# Patient Record
(demographics unavailable — no encounter records)

---

## 2024-10-31 NOTE — HISTORY OF PRESENT ILLNESS
[FreeTextEntry8] : 72 yo F presenting for evaluation for three month progressive fatigue. There is no weakness, muscle aches, fevers, chills, weight loss, loss of appetite, depression, anxiety, joint pains, rash.

## 2024-10-31 NOTE — HEALTH RISK ASSESSMENT
[No] : No [No falls in past year] : Patient reported no falls in the past year [0] : 2) Feeling down, depressed, or hopeless: Not at all (0) [PHQ-2 Negative - No further assessment needed] : PHQ-2 Negative - No further assessment needed [Never] : Never [PFI4Pauul] : 0

## 2024-11-13 NOTE — ASSESSMENT
[FreeTextEntry1] : For prevention,  Continue propranolol 160 mg twice daily.  Whenever she has tried to reduce this her headaches come back. Continue topiramate 200 mg daily.  She is aware of its potential to cause glaucoma but her ophthalmologist is also aware of this and performed the iridotomy and she will schedule for her cataract surgery in the next few months. She keeps herself very well-hydrated.  She knows the risk of kidney stones with Topamax. Continue magnesium 400 mg daily. Continue Emgality.  For rescue, Zomig as needed for now.  Sometimes it is not consistent, will order Nurtec to take as needed.    Maintain headache journal.  Imaging as outlined below- headaches have changed characteristic and are waking her up from sleep.   Follow-up in 6 months.

## 2024-11-13 NOTE — PHYSICAL EXAM
[FreeTextEntry1] : Physical examination  General: No acute distress, Awake, Alert.   Mental status  Awake, alert, and oriented to person, time and place, Normal attention span and concentration, Recent and remote memory intact, Language intact, Fund of knowledge intact.  Cranial Nerves  II: VFF  III, IV, VI: PERRL, EOMI. left eye pupil 1 mm and right eye pupil 2 mm.  V: Facial sensation is normal B/L.  VII: Facial strength is normal B/L.  VIII: Gross hearing is intact.  IX, X: Palate is midline and elevates symmetrically.  XI: Trapezius normal strength.  XII: Tongue midline without atrophy or fasciculations.   Motor exam  Muscle tone - no evidence of rigidity or resistance in all 4 extremities.  No atrophy or fasciculations  Muscle Strength: arms and legs, proximal and distal flexors and extensors are normal  No UE drift.  Reflexes  All present, normal, and symmetrical.  Plantars right: mute.  Plantars left: mute.   Coordination  Finger to nose: Normal.  Heel to shin: Normal.  Gait  Normal

## 2024-11-13 NOTE — HISTORY OF PRESENT ILLNESS
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 Headache Age of onset: age 14  Description Unilateral - could switch sides - now trigeminal pattern - on the left (Post Covid)- headaches were under excellent control for 3 years until Covid last December (2023).  Quality- pulsating, stabbing  Nausea but no vomiting Light and sound sensitivity Warning/aura (premonitory phase)- left ear tingles and within 30 seconds to one minute, headache  Postdrome: fatigue  Nocturnal awakening- wake her up from a deep sleep  Associated with exertion or Valsalva- no   Once headache starts- duration: With medication: Without medication:  Average number of headache days per week: one per week (usually in clusters)  Menopause - did not help Association with menstrual cycle- no association  Triggers weather dehydration salt   Sleep pattern: 11 pm - 1 am - wakes up 8-9 am   Water intake: 48-64 oz   Caffeine intake: one cup espresso   Anxiety/depression/stress: no   Family History of headaches: Mother, sister, father, (maternal and paternal)  Personal history of head trauma: fell one year ago   Preventatives tried: Propranolol - since age 22; Topiramate - 10 years ago - (ophthalmology aware- did iridotomy for chronic anterior angle glaucoma- done to prevent acute angle, cataracts were pushing- cataract surgery in the future); Emgality now (was on Ajovy)  Takes branded Topamax - Carlos (generic dizziness) Branded Zomig - Carlos Magnesium 400 mg daily   Rescue tried: Sumatriptan, Maxalt, Zomig   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 No ptosis or unilateral anhidrosis.  Calcium score 0.  No coronary artery disease.

## 2024-11-13 NOTE — HISTORY OF PRESENT ILLNESS
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 Headache Age of onset: age 14  Description Unilateral - could switch sides - now trigeminal pattern - on the left (Post Covid)- headaches were under excellent control for 3 years until Covid last December (2023).  Quality- pulsating, stabbing  Nausea but no vomiting Light and sound sensitivity Warning/aura (premonitory phase)- left ear tingles and within 30 seconds to one minute, headache  Postdrome: fatigue  Nocturnal awakening- wake her up from a deep sleep  Associated with exertion or Valsalva- no   Once headache starts- duration: With medication: Without medication:  Average number of headache days per week: one per week (usually in clusters)  Menopause - did not help Association with menstrual cycle- no association  Triggers weather dehydration salt   Sleep pattern: 11 pm - 1 am - wakes up 8-9 am   Water intake: 48-64 oz   Caffeine intake: one cup espresso   Anxiety/depression/stress: no   Family History of headaches: Mother, sister, father, (maternal and paternal)  Personal history of head trauma: fell one year ago   Preventatives tried: Propranolol - since age 22; Topiramate - 10 years ago - (ophthalmology aware- did iridotomy for chronic anterior angle glaucoma- done to prevent acute angle, cataracts were pushing- cataract surgery in the future); Emgality now (was on Ajovy)  Takes branded Topamax - Carlos (generic dizziness) Branded Zomig - Carlos Magnesium 400 mg daily   Rescue tried: Sumatriptan, Maxalt, Zomig   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 No ptosis or unilateral anhidrosis.  Calcium score 0.  No coronary artery disease.

## 2025-01-22 NOTE — HISTORY OF PRESENT ILLNESS
[FreeTextEntry1] : follow up 6 months [de-identified] : 71 yo F with hx of SCC on nose, chronic migraines, MVP, PFO, osteopoenia, GERD, presenting for 6 month follow up. 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 Patient established care with neurology for management of migraines and they have been well controlled. Needs refills on some medications today. Also reports chronic right lateral and anterior hip pain that has been persistent. Reports had MRI previously but per patient was negative. She has done multiple rounds of physical therapy for its management.

## 2025-01-22 NOTE — PLAN
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 Patient established care with neurology for management of migraines and they have been well controlled. Needs refills on some medications today. Also reports chronic right lateral and anterior hip pain that has been persistent. Reports had MRI previously but per patient was negative. She has done multiple rounds of physical therapy for its management.  HLD - labs drawn, statin renewed Migraines - stable, follows with neurology, chart reviewed Right hip pain - referral to ortho sports medicine for evaluation, imaging deferred at this time Return to clinic in 6 months for annual physical, all questions answered

## 2025-01-22 NOTE — PHYSICAL EXAM
[Normal] : affect was normal and insight and judgment were intact [de-identified] : negative MATT, FADIR test, right hip pain

## 2025-01-22 NOTE — PHYSICAL EXAM
[Normal] : affect was normal and insight and judgment were intact [de-identified] : negative MATT, FADIR test, right hip pain

## 2025-01-22 NOTE — HISTORY OF PRESENT ILLNESS
[FreeTextEntry1] : follow up 6 months [de-identified] : 71 yo F with hx of SCC on nose, chronic migraines, MVP, PFO, osteopoenia, GERD, presenting for 6 month follow up. 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 Patient established care with neurology for management of migraines and they have been well controlled. Needs refills on some medications today. Also reports chronic right lateral and anterior hip pain that has been persistent. Reports had MRI previously but per patient was negative. She has done multiple rounds of physical therapy for its management.

## 2025-02-14 NOTE — PHYSICAL EXAM
[de-identified] :  Well appearing person in no acute distress. Alert and oriented x 3. Appears their stated age. Normal mood and affect.  They have good range of motion of her hip today. They have no pain with flexion. They have a positive FADIR test. Mild tenderness over the greater trochanteric region. No pain with range of motion of the knee or ankle. No pain with range of motion of the lumbar spine.  Mild discomfort with a Stinchfield. [de-identified] : Multiple weightbearing views right hip and pelvis ordered and reviewed.  These demonstrate significant arthritic changes in the right hip.  Left hip joint spaces well-maintained.  Right hip is getting close to bone-on-bone arthritis

## 2025-02-14 NOTE — DISCUSSION/SUMMARY
[de-identified] : I had a long discussion with the patient.  We discussed operative and nonoperative treatment options.  Right now I think she would best be served with a one-time steroid injection ultrasound-guided into the right hip joint.  She was amenable to this.  Will see how she does on this.  If she continues to have discomfort she may be a candidate for operative sometime for a total hip arthroplasty but right now hopefully we can get the hip quite down with just the injection.  Should continue with the therapy exercises.  All of her questions were answered, she agrees with the plan

## 2025-02-14 NOTE — PHYSICAL EXAM
[de-identified] :  Well appearing person in no acute distress. Alert and oriented x 3. Appears their stated age. Normal mood and affect.  They have good range of motion of her hip today. They have no pain with flexion. They have a positive FADIR test. Mild tenderness over the greater trochanteric region. No pain with range of motion of the knee or ankle. No pain with range of motion of the lumbar spine.  Mild discomfort with a Stinchfield. [de-identified] : Multiple weightbearing views right hip and pelvis ordered and reviewed.  These demonstrate significant arthritic changes in the right hip.  Left hip joint spaces well-maintained.  Right hip is getting close to bone-on-bone arthritis

## 2025-02-14 NOTE — HISTORY OF PRESENT ILLNESS
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  This is been going on for some time.  Pain is mostly in the groin.  Usually she can get this better on her own with the therapy exercises but this time is persisted.  She is tried some anti-inflammatory medications as well as icing and is only seen a bit of benefit.

## 2025-02-14 NOTE — HISTORY OF PRESENT ILLNESS
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  This is been going on for some time.  Pain is mostly in the groin.  Usually she can get this better on her own with the therapy exercises but this time is persisted.  She is tried some anti-inflammatory medications as well as icing and is only seen a bit of benefit.

## 2025-02-14 NOTE — DISCUSSION/SUMMARY
[de-identified] : I had a long discussion with the patient.  We discussed operative and nonoperative treatment options.  Right now I think she would best be served with a one-time steroid injection ultrasound-guided into the right hip joint.  She was amenable to this.  Will see how she does on this.  If she continues to have discomfort she may be a candidate for operative sometime for a total hip arthroplasty but right now hopefully we can get the hip quite down with just the injection.  Should continue with the therapy exercises.  All of her questions were answered, she agrees with the plan

## 2025-02-18 NOTE — ASSESSMENT
[FreeTextEntry1] : Chronic right hip and groin pain with findings of hip osteoarthritis on exam and x-ray - Right ultrasound-guided corticosteroid injection given to the right hip - Tylenol and ice as needed for injection site relief - Discussed activity modifications - Follow-up as needed.  Earliest repeat would be at 5/18/2025

## 2025-02-18 NOTE — PHYSICAL EXAM
[de-identified] : Constitutional: Well developed, well nourished, able to communicate Neuro: Normal sensation, No focal deficits Skin: Intact CV: Peripheral vascular exam grossly normal Pulm: No signs of respiratory distress Psych: Oriented, normal mood and affect      R hip: - No obvious deformity or swelling - No tenderness to palpation of greater trochanter - No pain with full flexion, external rotation - Limited internal rotation with pain - 5/5 strength hip flexion, abduction, adduction - Negative MATT - Positive FADIR - Mild pain with logroll - Distally neurovascularly intact

## 2025-02-18 NOTE — PROCEDURE
[FreeTextEntry1] : A pre-procedure verification was performed by asking the patient to state their name, , and the location of the procedure being performed in their own words.  A review of allergies was accomplished through dialog and the patient, ending thus in the full agreement. The risks and benefits were explained and consent were obtained verbally.  Procedure: Ultrasound Guided Intraarticular Injection >>  Hip, right  Consent was obtained and recorded. Patients questions were answered to the best of my ability prior to procedure. Injection site and surrounding bony landmarks were palpated, and marked prior to proceeding. Target location and needle pathway identified under ultrasound. Ultrasound probe was sterilized in the typical fashion prior to application of sterile gel. Injection site was cleaned and prepped in the typical fashion using alcohol swabs. Cold spray used on skin for patient comfort. 3cc Lidocaine w/o Epi was used to anesthetize the needle path. Injection site and ultrasound probe we re-cleaned/sterilized and re-prepped in the typical fashion while local anesthesia took effect. Sterile gel was applied and site was identified once again with ultrasound. Needle was advanced into the intraarticular space from the anterior approach. A combination of 80mg Depomedrol and 3cc Marcaine w/o Epi was then injected into the joint. Patient tolerated the procedure well, without significant complications. Minimal (<3cc) blood loss experienced. Images were saved into patient's chart.

## 2025-02-18 NOTE — HISTORY OF PRESENT ILLNESS
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  She has had the symptoms in the past but is usually improved with physical therapy and time.  She has tried anti-inflammatory medications without much benefit.  She was seen initially by Dr. Oconnor and recommended for a right hip corticosteroid injection.  She denies any injuries, weakness, paresthesias

## 2025-02-28 NOTE — HISTORY OF PRESENT ILLNESS
[FreeTextEntry8] : 71 yo F presenting for evaluation 1 month hx of loose stools. Patient reports she is normally constipated and often goes about every 2-3 days. She had an episode of eating homemade egg salad on 2/1/25 that resulted in vomiting, nausea and loose stools and since then have not been same calibre. She has been following BRAT diet and making dietary changes to help her gut leonel, including having a probiotic. Currently she has a loose stool every 2-3 days as per her usual bowel movements. Denies any pain, cramping, blood in stool, tissue in stool.

## 2025-02-28 NOTE — PLAN
[FreeTextEntry1] : No red flag signs and symptoms Continue to incorporate regular diet back into her daily routine and monitor stools Infrequent at this point Continue bulking foods for loose stools All questions answered Exam reassuring

## 2025-05-15 NOTE — ASSESSMENT
[FreeTextEntry1] : For prevention,  Continue propranolol 160 mg twice daily.  Whenever she has tried to reduce this her headaches come back. Continue topiramate 200 mg daily.  She is aware of its potential to cause glaucoma but her ophthalmologist is also aware of this and performed the iridotomy and she will schedule for her cataract surgery in the next few months. She keeps herself very well-hydrated.  She knows the risk of kidney stones with Topamax. Continue magnesium 400 mg daily.   For rescue, Nurtec prn  Maintain headache journal.  Follow-up in one year

## 2025-05-15 NOTE — HISTORY OF PRESENT ILLNESS
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 Shes states that her headaches have been well controlled on current regimen. She has only had to use Nurtec once for rescue and it did work. Denies lateralizing deficits with headaches.   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 Headache Age of onset: age 14  Description Unilateral - could switch sides - now trigeminal pattern - on the left (Post Covid)- headaches were under excellent control for 3 years until Covid last December (2023).  Quality- pulsating, stabbing  Nausea but no vomiting Light and sound sensitivity Warning/aura (premonitory phase)- left ear tingles and within 30 seconds to one minute, headache  Postdrome: fatigue  Nocturnal awakening- wake her up from a deep sleep  Associated with exertion or Valsalva- no   Once headache starts- duration: With medication: Without medication:  Average number of headache days per week: one per week (usually in clusters)  Menopause - did not help Association with menstrual cycle- no association  Triggers weather dehydration salt   Sleep pattern: 11 pm - 1 am - wakes up 8-9 am   Water intake: 48-64 oz   Caffeine intake: one cup espresso   Anxiety/depression/stress: no   Family History of headaches: Mother, sister, father, (maternal and paternal)  Personal history of head trauma: fell one year ago   Preventatives tried: Propranolol - since age 22; Topiramate - 10 years ago - (ophthalmology aware- did iridotomy for chronic anterior angle glaucoma- done to prevent acute angle, cataracts were pushing- cataract surgery in the future); Emgality now (was on Ajovy)  Takes branded Topamax - Carlos (generic dizziness) Branded Zomig - Carlos Magnesium 400 mg daily   Rescue tried: Sumatriptan, Maxalt, Zomig   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 No ptosis or unilateral anhidrosis.  Calcium score 0.  No coronary artery disease.

## 2025-05-15 NOTE — DATA REVIEWED
[de-identified] : Exam Date:      12/13/24 Exam:         MRI BRAIN WAW  Order#:       [id number]    CLINICAL INDICATIONS: Chronic migraines   COMPARISON: None.   TECHNIQUE: MRI brain: Multiplanar, multisequence MR imaging of the brain are obtained with and without the administration of 6.5 cc intravenous Gadavist contrast. 1 cc of contrast was discarded.   FINDINGS:  Hypoenhancing lesion right aspect pituitary gland measures 6.2 mm suggesting a cystic microadenoma. Correlate with serology and dedicated pituitary gland imaging as clinically warranted.  There is no abnormal restricted diffusion to suggest acute infarction. Scattered periventricular and subcortical white matter T2 /FLAIR hyperintensities are seen without mass effect, nonspecific, likely representing mild chronic microvascular changes. Normal T2 flow-voids are seen within  the intracranial vasculature. The lateral ventricles and cortical sulci are age-appropriate in size and configuration. There is no mass, mass effect, or extra-axial fluid collection. Small susceptibility artifacts in the paramedian left frontal lobe and left parietal lobe suggesting cavernoma. Structures are normal.  The visualized paranasal sinuses, mastoid air cells and orbits are unremarkable.      ======================    CLINICAL INDICATIONS:MIOSIS   TECHNIQUE: MRA brain. 3-D time of flight imaging with 2D MIP reconstructions.   COMPARISON: None   FINDINGS:   The [place] of [place] and vertebrobasilar system are unremarkable without evidence of stenosis, occlusion or saccular aneurysm dilation. No evidence for arterial venous malformation. The left vertebral artery is dominant.     =====================    IMPRESSION:   MRI BRAIN: No acute intracranial pathology. Possible small pituitary gland microadenoma.   MRA BRAIN: Unremarkable.   --- End of Report ---

## 2025-05-15 NOTE — DATA REVIEWED
[de-identified] : Exam Date:      12/13/24 Exam:         MRI BRAIN WAW  Order#:       [id number]    CLINICAL INDICATIONS: Chronic migraines   COMPARISON: None.   TECHNIQUE: MRI brain: Multiplanar, multisequence MR imaging of the brain are obtained with and without the administration of 6.5 cc intravenous Gadavist contrast. 1 cc of contrast was discarded.   FINDINGS:  Hypoenhancing lesion right aspect pituitary gland measures 6.2 mm suggesting a cystic microadenoma. Correlate with serology and dedicated pituitary gland imaging as clinically warranted.  There is no abnormal restricted diffusion to suggest acute infarction. Scattered periventricular and subcortical white matter T2 /FLAIR hyperintensities are seen without mass effect, nonspecific, likely representing mild chronic microvascular changes. Normal T2 flow-voids are seen within  the intracranial vasculature. The lateral ventricles and cortical sulci are age-appropriate in size and configuration. There is no mass, mass effect, or extra-axial fluid collection. Small susceptibility artifacts in the paramedian left frontal lobe and left parietal lobe suggesting cavernoma. Structures are normal.  The visualized paranasal sinuses, mastoid air cells and orbits are unremarkable.      ======================    CLINICAL INDICATIONS:MIOSIS   TECHNIQUE: MRA brain. 3-D time of flight imaging with 2D MIP reconstructions.   COMPARISON: None   FINDINGS:   The [place] of [place] and vertebrobasilar system are unremarkable without evidence of stenosis, occlusion or saccular aneurysm dilation. No evidence for arterial venous malformation. The left vertebral artery is dominant.     =====================    IMPRESSION:   MRI BRAIN: No acute intracranial pathology. Possible small pituitary gland microadenoma.   MRA BRAIN: Unremarkable.   --- End of Report ---

## 2025-05-15 NOTE — HISTORY OF PRESENT ILLNESS
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 Shes states that her headaches have been well controlled on current regimen. She has only had to use Nurtec once for rescue and it did work. Denies lateralizing deficits with headaches.   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 Headache Age of onset: age 14  Description Unilateral - could switch sides - now trigeminal pattern - on the left (Post Covid)- headaches were under excellent control for 3 years until Covid last December (2023).  Quality- pulsating, stabbing  Nausea but no vomiting Light and sound sensitivity Warning/aura (premonitory phase)- left ear tingles and within 30 seconds to one minute, headache  Postdrome: fatigue  Nocturnal awakening- wake her up from a deep sleep  Associated with exertion or Valsalva- no   Once headache starts- duration: With medication: Without medication:  Average number of headache days per week: one per week (usually in clusters)  Menopause - did not help Association with menstrual cycle- no association  Triggers weather dehydration salt   Sleep pattern: 11 pm - 1 am - wakes up 8-9 am   Water intake: 48-64 oz   Caffeine intake: one cup espresso   Anxiety/depression/stress: no   Family History of headaches: Mother, sister, father, (maternal and paternal)  Personal history of head trauma: fell one year ago   Preventatives tried: Propranolol - since age 22; Topiramate - 10 years ago - (ophthalmology aware- did iridotomy for chronic anterior angle glaucoma- done to prevent acute angle, cataracts were pushing- cataract surgery in the future); Emgality now (was on Ajovy)  Takes branded Topamax - Carlos (generic dizziness) Branded Zomig - [place] Magnesium 400 mg daily   Rescue tried: Sumatriptan, Maxalt, Zomig   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 No ptosis or unilateral anhidrosis.  Calcium score 0.  No coronary artery disease. No

## 2025-05-28 NOTE — PHYSICAL EXAM
[MA] : MA [Appropriately responsive] : appropriately responsive [Alert] : alert [No Acute Distress] : no acute distress [No Lymphadenopathy] : no lymphadenopathy [Soft] : soft [Non-tender] : non-tender [Non-distended] : non-distended [No HSM] : No HSM [No Lesions] : no lesions [No Mass] : no mass [Oriented x3] : oriented x3 [Examination Of The Breasts] : a normal appearance [No Masses] : no breast masses were palpable [Labia Majora] : normal [Labia Minora] : normal [Normal] : normal [Uterine Adnexae] : normal [FreeTextEntry2] : Mallika [FreeTextEntry5] : Pap today

## 2025-05-28 NOTE — PLAN
[FreeTextEntry1] : Plan: - Summary : Perform comprehensive gynecological examination including pap smear, breast exam, and pelvic exam. Order pelvic ultrasound to assess left ovarian calcification. Recommend continued bone health management and regular follow-ups with specialists. - Plan : - Perform pap smear  - Conduct breast and pelvic examination  - Schedule pelvic ultrasound to assess left ovarian calcification  - Order mammogram  - Encourage continuation of current active lifestyle and calcium-rich diet  - Recommend continued vitamin D3 supplementation  - Advise follow-up with cardiologist as scheduled in June  - Recommend continued follow-up with urologist next year for persistent microscopic hematuria  - Discuss age-appropriate cancer screenings and preventive care  - Follow up after test results are available  Pelvic ultrasound to evaluate the ovary

## 2025-05-28 NOTE — HISTORY OF PRESENT ILLNESS
[Patient reported PAP Smear was normal] : Patient reported PAP Smear was normal [Patient reported bone density results were abnormal] : Patient reported bone density results were abnormal [Patient reported colonoscopy was normal] : Patient reported colonoscopy was normal [FreeTextEntry1] : 72-year-old female presenting for routine gynecological checkup. Patient reports feeling good and maintaining an active lifestyle. No specific complaints reported.  - History of Present Illness (HPI) : Patient presents for routine gynecological checkup. She reports feeling well and maintains an active lifestyle, including daily walks of 2-3 miles, practicing Qigong, and visiting museums. Patient has no specific gynecological complaints. Last pap smear was three years ago. Patient has a history of one ASCUS pap with negative colposcopy in the past.  - Past Medical History : Mitral valve prolapse (congenital), Osteopenia, History of COVID-19 infection, Arthritis, Calcification on left ovary, Right ovary removed, History of ASCUS pap smear - Past Surgical History : Right oophorectomy, Breast biopsy (many years ago) - Family History : No family history of breast cancer reported. Patient reports good quality of life in family members. - Social History : Retired, , two adult sons, two grandchildren. Engages in regular exercise including walking 2-3 miles daily and practicing Qigong. Visits museums and stays socially active. - Review of Systems : Cardiovascular Stable cardiac condition, follows up with cardiologist. Genitourinary Reports persistent microscopic hematuria, follows up with urologist every other year. Musculoskeletal Reports some arthritis. - Medications : Vitamin D3 - Allergies : No known allergies reported [Mammogramdate] : 5/2/2024 [TextBox_19] : Risk assessment 32% BI-RADS 1 dense breast [BreastSonogramDate] : 5/2/2024 [TextBox_25] : Risk assessment 32% BI-RADS 1 dense breast [PapSmeardate] : 1/2022 [TextBox_31] : History of abnormal Pap with colposcopy [BoneDensityDate] : 01/2023 [TextBox_37] : Osteopenia [ColonoscopyDate] : 01/2019 [TextBox_43] : 10-year recall Warm

## 2025-06-25 NOTE — CARDIOLOGY SUMMARY
[de-identified] : 6/24/25 ECG: Sinus bradycardia, rate 53 bpm, 1st degree AV block  6/18/24 ECG: Sinus bradycardia, rate 50 bpm, 1st degree AV block, poor R wave progression [de-identified] : 11/7/23 Echo: Normal LV size and systolic/diastolic function, LVEF > 55%. Mod LAE. Mild AR. Myxomatous MV with bi-leaflet prolapse and moderate MR. Dilated aortic root and ascending aorta (3.7 cm).

## 2025-06-25 NOTE — CARDIOLOGY SUMMARY
[de-identified] : 6/24/25 ECG: Sinus bradycardia, rate 53 bpm, 1st degree AV block  6/18/24 ECG: Sinus bradycardia, rate 50 bpm, 1st degree AV block, poor R wave progression [de-identified] : 11/7/23 Echo: Normal LV size and systolic/diastolic function, LVEF > 55%. Mod LAE. Mild AR. Myxomatous MV with bi-leaflet prolapse and moderate MR. Dilated aortic root and ascending aorta (3.7 cm).

## 2025-06-25 NOTE — ASSESSMENT
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 ECG today demonstrated sinus bradycardia, rate 53 bpm, 1st degree AV block.  Patient with bi-leaflet MV prolapse with moderate MR and mild AR per 11/2023 echo. Will perform echocardiogram to re-evaluate MVP and AR/MR severity.   LDL 85 per 1/2025 labs. Will continue atorvastatin 10 mg po daily for hyperlipidemia management at this time.  Will order abdominal aorta US to screen for AAA.

## 2025-06-25 NOTE — PHYSICAL EXAM
[Well Developed] : well developed [Well Nourished] : well nourished [No Acute Distress] : no acute distress [Normal Conjunctiva] : normal conjunctiva [Normal Venous Pressure] : normal venous pressure [No Carotid Bruit] : no carotid bruit [Normal Rate] : normal [Rhythm Regular] : regular [Normal S1] : normal S1 [Normal S2] : normal S2 [No Pitting Edema] : no pitting edema present [Clear Lung Fields] : clear lung fields [Good Air Entry] : good air entry [No Respiratory Distress] : no respiratory distress  [No Edema] : no edema [No Cyanosis] : no cyanosis [No Clubbing] : no clubbing [Moves all extremities] : moves all extremities [No Focal Deficits] : no focal deficits [Normal Speech] : normal speech [Alert and Oriented] : alert and oriented [Normal memory] : normal memory [Right Carotid Bruit] : no bruit heard over the right carotid [Left Carotid Bruit] : no bruit heard over the left carotid [de-identified] : Grade 2/6 holosystolic murmur which radiates to axilla

## 2025-06-25 NOTE — HISTORY OF PRESENT ILLNESS
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 Patient denies chest pain, dyspnea, palpitations, syncope, edema, melena, hematochezia, or hematemesis. She walks regularly without exertional complaints.  Former cardiologist: Ángel Case (retired) -> Elver Torres ([employer])

## 2025-06-25 NOTE — ASSESSMENT
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 ECG today demonstrated sinus bradycardia, rate 53 bpm, 1st degree AV block.  Patient with bi-leaflet MV prolapse with moderate MR and mild AR per 11/2023 echo. Will perform echocardiogram to re-evaluate MVP and AR/MR severity.   LDL 85 per 1/2025 labs. Will continue atorvastatin 10 mg po daily for hyperlipidemia management at this time.  Will order abdominal aorta US to screen for AAA.

## 2025-06-25 NOTE — PHYSICAL EXAM
[Well Developed] : well developed [Well Nourished] : well nourished [No Acute Distress] : no acute distress [Normal Conjunctiva] : normal conjunctiva [Normal Venous Pressure] : normal venous pressure [No Carotid Bruit] : no carotid bruit [Normal Rate] : normal [Rhythm Regular] : regular [Normal S1] : normal S1 [Normal S2] : normal S2 [No Pitting Edema] : no pitting edema present [Clear Lung Fields] : clear lung fields [Good Air Entry] : good air entry [No Respiratory Distress] : no respiratory distress  [No Edema] : no edema [No Cyanosis] : no cyanosis [No Clubbing] : no clubbing [Moves all extremities] : moves all extremities [No Focal Deficits] : no focal deficits [Normal Speech] : normal speech [Alert and Oriented] : alert and oriented [Normal memory] : normal memory [Right Carotid Bruit] : no bruit heard over the right carotid [Left Carotid Bruit] : no bruit heard over the left carotid [de-identified] : Grade 2/6 holosystolic murmur which radiates to axilla

## 2025-07-22 NOTE — PLAN
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 Patient established care with neurology for management of migraines and they have been well controlled. Denies any acute complaints at this time. Denies any headaches, chest pain, shortness of breath, nausea/vomiting, diarrhea, constipation, changes in vision.  HLD - labs drawn, reviewed cardiology notes Migraines - stable, follows with neurology, chart reviewed Right hip pain - resolved, received steroid injection with sports medicine GERD - medications renewed Return to clinic in 6 months for follow up, all questions answered.

## 2025-07-22 NOTE — HISTORY OF PRESENT ILLNESS
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 Patient established care with neurology for management of migraines and they have been well controlled. Denies any acute complaints at this time. Denies any headaches, chest pain, shortness of breath, nausea/vomiting, diarrhea, constipation, changes in vision.

## 2025-07-22 NOTE — HISTORY OF PRESENT ILLNESS
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 Patient established care with neurology for management of migraines and they have been well controlled. Denies any acute complaints at this time. Denies any headaches, chest pain, shortness of breath, nausea/vomiting, diarrhea, constipation, changes in vision.

## 2025-07-22 NOTE — HEALTH RISK ASSESSMENT
[No] : No [No falls in past year] : Patient reported no falls in the past year [0] : 2) Feeling down, depressed, or hopeless: Not at all (0) [PHQ-2 Negative - No further assessment needed] : PHQ-2 Negative - No further assessment needed [Never] : Never [Patient reported mammogram was normal] : Patient reported mammogram was normal [Patient reported PAP Smear was normal] : Patient reported PAP Smear was normal [Patient reported bone density results were abnormal] : Patient reported bone density results were abnormal [Patient reported colonoscopy was normal] : Patient reported colonoscopy was normal [Good] : ~his/her~  mood as  good [de-identified] : neurology, gynecology, sports medicine [de-identified] : walk every day, stretching  [de-identified] :  no diet  [[id number]] : 0 [With Significant Other] : lives with significant other [Retired] : retired [] :  [Fully functional (bathing, dressing, toileting, transferring, walking, feeding)] : Fully functional (bathing, dressing, toileting, transferring, walking, feeding) [Fully functional (using the telephone, shopping, preparing meals, housekeeping, doing laundry, using] : Fully functional and needs no help or supervision to perform IADLs (using the telephone, shopping, preparing meals, housekeeping, doing laundry, using transportation, managing medications and managing finances) [Reports changes in hearing] : Reports no changes in hearing [Reports changes in vision] : Reports no changes in vision [MammogramDate] : 06/25 [PapSmearDate] : 05/25 [BoneDensityDate] : 05/24 [BoneDensityComments] : mild osteopenia [ColonoscopyDate] : 03/19

## 2025-07-22 NOTE — HEALTH RISK ASSESSMENT
[No] : No [No falls in past year] : Patient reported no falls in the past year [0] : 2) Feeling down, depressed, or hopeless: Not at all (0) [PHQ-2 Negative - No further assessment needed] : PHQ-2 Negative - No further assessment needed [Never] : Never [Patient reported mammogram was normal] : Patient reported mammogram was normal [Patient reported PAP Smear was normal] : Patient reported PAP Smear was normal [Patient reported bone density results were abnormal] : Patient reported bone density results were abnormal [Patient reported colonoscopy was normal] : Patient reported colonoscopy was normal [Good] : ~his/her~  mood as  good [de-identified] : neurology, gynecology, sports medicine [de-identified] : walk every day, stretching  [de-identified] :  no diet  [[id number]] : 0 [With Significant Other] : lives with significant other [Retired] : retired [] :  [Fully functional (bathing, dressing, toileting, transferring, walking, feeding)] : Fully functional (bathing, dressing, toileting, transferring, walking, feeding) [Fully functional (using the telephone, shopping, preparing meals, housekeeping, doing laundry, using] : Fully functional and needs no help or supervision to perform IADLs (using the telephone, shopping, preparing meals, housekeeping, doing laundry, using transportation, managing medications and managing finances) [Reports changes in hearing] : Reports no changes in hearing [Reports changes in vision] : Reports no changes in vision [MammogramDate] : 06/25 [PapSmearDate] : 05/25 [BoneDensityDate] : 05/24 [BoneDensityComments] : mild osteopenia [ColonoscopyDate] : 03/19

## 2025-07-22 NOTE — PLAN
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 Patient established care with neurology for management of migraines and they have been well controlled. Denies any acute complaints at this time. Denies any headaches, chest pain, shortness of breath, nausea/vomiting, diarrhea, constipation, changes in vision.  HLD - labs drawn, reviewed cardiology notes Migraines - stable, follows with neurology, chart reviewed Right hip pain - resolved, received steroid injection with sports medicine GERD - medications renewed Return to clinic in 6 months for follow up, all questions answered.